# Patient Record
Sex: FEMALE | Race: BLACK OR AFRICAN AMERICAN | NOT HISPANIC OR LATINO
[De-identification: names, ages, dates, MRNs, and addresses within clinical notes are randomized per-mention and may not be internally consistent; named-entity substitution may affect disease eponyms.]

---

## 2022-06-17 ENCOUNTER — TRANSCRIPTION ENCOUNTER (OUTPATIENT)
Age: 47
End: 2022-06-17

## 2022-06-17 ENCOUNTER — RESULT REVIEW (OUTPATIENT)
Age: 47
End: 2022-06-17

## 2022-06-17 ENCOUNTER — OUTPATIENT (OUTPATIENT)
Dept: OUTPATIENT SERVICES | Facility: HOSPITAL | Age: 47
LOS: 1 days | Discharge: HOME | End: 2022-06-17
Payer: COMMERCIAL

## 2022-06-17 VITALS
OXYGEN SATURATION: 99 % | DIASTOLIC BLOOD PRESSURE: 76 MMHG | WEIGHT: 164.02 LBS | RESPIRATION RATE: 17 BRPM | HEIGHT: 66 IN | HEART RATE: 84 BPM | TEMPERATURE: 99 F | SYSTOLIC BLOOD PRESSURE: 128 MMHG

## 2022-06-17 VITALS — SYSTOLIC BLOOD PRESSURE: 121 MMHG | HEART RATE: 68 BPM | DIASTOLIC BLOOD PRESSURE: 76 MMHG

## 2022-06-17 DIAGNOSIS — Z98.890 OTHER SPECIFIED POSTPROCEDURAL STATES: Chronic | ICD-10-CM

## 2022-06-17 PROCEDURE — 88307 TISSUE EXAM BY PATHOLOGIST: CPT | Mod: 26

## 2022-06-17 RX ORDER — CETIRIZINE HYDROCHLORIDE 10 MG/1
0 TABLET ORAL
Qty: 0 | Refills: 0 | DISCHARGE

## 2022-06-17 RX ORDER — IBUPROFEN 200 MG
1 TABLET ORAL
Qty: 20 | Refills: 0
Start: 2022-06-17

## 2022-06-17 NOTE — ASU DISCHARGE PLAN (ADULT/PEDIATRIC) - NS MD DC FALL RISK RISK
For information on Fall & Injury Prevention, visit: [email or web address] OR  [email or web address] OR  https://www.cdc.gov/steadi/patient.html

## 2022-06-17 NOTE — BRIEF OPERATIVE NOTE - NSICDXBRIEFPROCEDURE_GEN_ALL_CORE_FT
PROCEDURES:  Excision, soft tissue tumor, subfascial, 1.5 cm or greater 17-Jun-2022 14:38:45  Juan Dougherty

## 2022-06-23 PROBLEM — Z00.00 ENCOUNTER FOR PREVENTIVE HEALTH EXAMINATION: Status: ACTIVE | Noted: 2022-06-23

## 2022-06-23 LAB — SURGICAL PATHOLOGY STUDY: SIGNIFICANT CHANGE UP

## 2022-06-24 ENCOUNTER — APPOINTMENT (OUTPATIENT)
Dept: ORTHOPEDIC SURGERY | Facility: CLINIC | Age: 47
End: 2022-06-24
Payer: COMMERCIAL

## 2022-06-24 VITALS — HEIGHT: 66 IN | BODY MASS INDEX: 26.52 KG/M2 | WEIGHT: 165 LBS

## 2022-06-24 DIAGNOSIS — D48.1 NEOPLASM OF UNCERTAIN BEHAVIOR OF CONNECTIVE AND OTHER SOFT TISSUE: ICD-10-CM

## 2022-06-24 DIAGNOSIS — D49.2 NEOPLASM OF UNSPECIFIED BEHAVIOR OF BONE, SOFT TISSUE, AND SKIN: ICD-10-CM

## 2022-06-24 DIAGNOSIS — R22.32 LOCALIZED SWELLING, MASS AND LUMP, LEFT UPPER LIMB: ICD-10-CM

## 2022-06-24 PROCEDURE — 99024 POSTOP FOLLOW-UP VISIT: CPT

## 2022-06-24 NOTE — PHYSICAL EXAM
[de-identified] :   Patient is well-healed surgical skin incision.  She has good but not full early range of motion of the thumb.  There is mild swelling present.

## 2022-06-24 NOTE — HISTORY OF PRESENT ILLNESS
[de-identified] :  Patient status post left thumb excision of mass.  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  She is complaining about some stiffness in the thumb.

## 2022-06-24 NOTE — ASSESSMENT
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  Her sutures removed.  She will start range-of-motion exercises on her own.  She will see me back if necessary.  She will return back to work full duty on July 22nd.

## 2023-07-25 ENCOUNTER — APPOINTMENT (OUTPATIENT)
Dept: ORTHOPEDIC SURGERY | Facility: CLINIC | Age: 48
End: 2023-07-25
Payer: COMMERCIAL

## 2023-07-25 ENCOUNTER — NON-APPOINTMENT (OUTPATIENT)
Age: 48
End: 2023-07-25

## 2023-07-25 VITALS — BODY MASS INDEX: 27.64 KG/M2 | WEIGHT: 172 LBS | HEIGHT: 66 IN

## 2023-07-25 DIAGNOSIS — M70.62 TROCHANTERIC BURSITIS, LEFT HIP: ICD-10-CM

## 2023-07-25 PROBLEM — I10 ESSENTIAL (PRIMARY) HYPERTENSION: Chronic | Status: ACTIVE | Noted: 2022-06-17

## 2023-07-25 PROCEDURE — 73503 X-RAY EXAM HIP UNI 4/> VIEWS: CPT | Mod: LT

## 2023-07-25 PROCEDURE — 99213 OFFICE O/P EST LOW 20 MIN: CPT

## 2023-07-25 RX ORDER — HYDROCHLOROTHIAZIDE 12.5 MG/1
TABLET ORAL
Refills: 0 | Status: ACTIVE | COMMUNITY

## 2023-07-25 RX ORDER — METHYLPREDNISOLONE 4 MG/1
4 TABLET ORAL
Qty: 1 | Refills: 0 | Status: ACTIVE | COMMUNITY
Start: 2023-07-25 | End: 1900-01-01

## 2023-07-25 NOTE — HISTORY OF PRESENT ILLNESS
[de-identified] : Patient is a 48-year-old female reports to the office for evaluation of her left hip pain that has been aggravating her for the past few days.  Denies any recent trauma or injury to the area.  Walking, range of motion, and palpating certain areas of the hip all aggravate the patient's pain.  Denies any numbness or tingling.  Has been taking meloxicam and tizanidine since yesterday with minimal relief.

## 2023-07-25 NOTE — IMAGING
[de-identified] : Left hip with pelvic view x-rays taken in office today revealed no obvious fractures, subluxations, or dislocations.  Questionable pincer deformity noted which may indicate KATERYNA.  Otherwise, no other significant abnormalities were seen.

## 2023-07-25 NOTE — PHYSICAL EXAM
[Left] : left hip [4___] : adduction 4[unfilled]/5 [2+] : posterior tibialis pulse: 2+ [] : light touch intact throughout [FreeTextEntry9] : Mild limited ROM secondary to pain [de-identified] : Discomfort with strength testing

## 2023-07-25 NOTE — DISCUSSION/SUMMARY
[de-identified] : Patient may continue to take meloxicam and tizanidine as directed.  A Medrol Dosepak was sent to the patient's pharmacy to help alleviate her symptoms.  She kindly declined a cortisone injection today.\par \par The patient was advised to rest/ice the area and may alternate with warm compresses as needed.  A script for physical therapy was printed for the patient so they can get started on that.\par \par The patient will follow-up in 6 weeks for further evaluation.  At that point, if the patient is still in pain, will consider further imaging studies and possible injection. All of the patient's questions/concerns were answered in detail.\par \par

## 2023-09-05 ENCOUNTER — APPOINTMENT (OUTPATIENT)
Dept: ORTHOPEDIC SURGERY | Facility: CLINIC | Age: 48
End: 2023-09-05